# Patient Record
Sex: MALE
[De-identification: names, ages, dates, MRNs, and addresses within clinical notes are randomized per-mention and may not be internally consistent; named-entity substitution may affect disease eponyms.]

---

## 2020-11-14 ENCOUNTER — NURSE TRIAGE (OUTPATIENT)
Dept: OTHER | Facility: CLINIC | Age: 45
End: 2020-11-14

## 2020-11-14 NOTE — TELEPHONE ENCOUNTER
Reason for Disposition   Hoarseness persists > 2 weeks    Answer Assessment - Initial Assessment Questions  1. DESCRIPTION: \"Describe your voice. \"      Horse    2. ONSET: \"When did the hoarseness begin? \"      3 weeks    3. COUGH: \"Is there a cough? \" If so, ask: \"How bad? \"      Yes slight every now and then only to clear throat     4. FEVER: \"Do you have a fever? \" If so, ask: \"What is your temperature, how was it measured, and when did it start? \"      None    5. RESPIRATORY STATUS: \"Describe your breathing. \"       No    6. ALLERGIES: \"Any allergy symptoms? \" If so, ask: \"What are they? \"      No    7. IRRITANTS: \"Do you smoke? \" \"Have you been exposed to any irritating fumes? \"      No    8. CAUSE: \"What do you think is causing the hoarseness? \"      Na    9. OTHER SYMPTOMS: \"Do you have any other symptoms? \" (e.g., sore throat, swelling, foreign body, rash)      Slight pain when swallow     10. PREGNANCY: \"Is there any chance you are pregnant? \" \"When was your last menstrual period? \"        na    Protocols used: [id number]    Patient called pre-service center ([facility]) MMO with red flag complaint. Brief description of triage: laryngitis     Triage indicates for patient to be seen by pcp in 2-3 days he does not have a PCP and does not want me to look up pcp     Care advice provided, patient verbalizes understanding; denies any other questions or concerns; instructed to call back for any new or worsening symptoms. Attention Provider: Thank you for allowing me to participate in the care of your patient. The patient was connected to triage in response to information provided to the [facility]. Please do not respond through this encounter as the response is not directed to a shared pool.